# Patient Record
Sex: FEMALE | Race: WHITE | Employment: UNEMPLOYED | URBAN - METROPOLITAN AREA
[De-identification: names, ages, dates, MRNs, and addresses within clinical notes are randomized per-mention and may not be internally consistent; named-entity substitution may affect disease eponyms.]

---

## 2017-02-16 ENCOUNTER — HOSPITAL ENCOUNTER (EMERGENCY)
Age: 47
Discharge: HOME | End: 2017-02-17
Attending: EMERGENCY MEDICINE
Payer: SUBSIDIZED

## 2017-02-16 ENCOUNTER — APPOINTMENT (OUTPATIENT)
Dept: CT IMAGING | Age: 47
End: 2017-02-16
Attending: EMERGENCY MEDICINE
Payer: SUBSIDIZED

## 2017-02-16 DIAGNOSIS — R10.10 PAIN OF UPPER ABDOMEN: Primary | ICD-10-CM

## 2017-02-16 LAB
ALBUMIN SERPL BCP-MCNC: 4.5 G/DL (ref 3.5–5)
ALBUMIN/GLOB SERPL: 1.2 {RATIO} (ref 1.2–3.5)
ALP SERPL-CCNC: 70 U/L (ref 50–136)
ALT SERPL-CCNC: 115 U/L (ref 12–65)
ANION GAP BLD CALC-SCNC: 16 MMOL/L (ref 7–16)
AST SERPL W P-5'-P-CCNC: 120 U/L (ref 15–37)
BILIRUB SERPL-MCNC: 0.3 MG/DL (ref 0.2–1.1)
BUN SERPL-MCNC: 14 MG/DL (ref 6–23)
CALCIUM SERPL-MCNC: 9.2 MG/DL (ref 8.3–10.4)
CHLORIDE SERPL-SCNC: 101 MMOL/L (ref 98–107)
CO2 SERPL-SCNC: 24 MMOL/L (ref 21–32)
CREAT SERPL-MCNC: 0.91 MG/DL (ref 0.6–1)
ERYTHROCYTE [DISTWIDTH] IN BLOOD BY AUTOMATED COUNT: 12.5 % (ref 11.9–14.6)
GLOBULIN SER CALC-MCNC: 3.8 G/DL (ref 2.3–3.5)
GLUCOSE SERPL-MCNC: 112 MG/DL (ref 65–100)
HCT VFR BLD AUTO: 44.7 % (ref 35.8–46.3)
HGB BLD-MCNC: 15 G/DL (ref 11.7–15.4)
LIPASE SERPL-CCNC: 164 U/L (ref 73–393)
MCH RBC QN AUTO: 29.9 PG (ref 26.1–32.9)
MCHC RBC AUTO-ENTMCNC: 33.6 G/DL (ref 31.4–35)
MCV RBC AUTO: 89 FL (ref 79.6–97.8)
PLATELET # BLD AUTO: 352 K/UL (ref 150–450)
PMV BLD AUTO: 9.9 FL (ref 10.8–14.1)
POTASSIUM SERPL-SCNC: 4 MMOL/L (ref 3.5–5.1)
PROT SERPL-MCNC: 8.3 G/DL (ref 6.3–8.2)
RBC # BLD AUTO: 5.02 M/UL (ref 4.05–5.25)
SODIUM SERPL-SCNC: 141 MMOL/L (ref 136–145)
WBC # BLD AUTO: 11.5 K/UL (ref 4.3–11.1)

## 2017-02-16 PROCEDURE — 96361 HYDRATE IV INFUSION ADD-ON: CPT | Performed by: EMERGENCY MEDICINE

## 2017-02-16 PROCEDURE — 74011250637 HC RX REV CODE- 250/637: Performed by: EMERGENCY MEDICINE

## 2017-02-16 PROCEDURE — 80053 COMPREHEN METABOLIC PANEL: CPT | Performed by: EMERGENCY MEDICINE

## 2017-02-16 PROCEDURE — 74011250636 HC RX REV CODE- 250/636: Performed by: EMERGENCY MEDICINE

## 2017-02-16 PROCEDURE — 96374 THER/PROPH/DIAG INJ IV PUSH: CPT | Performed by: EMERGENCY MEDICINE

## 2017-02-16 PROCEDURE — 74011000250 HC RX REV CODE- 250: Performed by: EMERGENCY MEDICINE

## 2017-02-16 PROCEDURE — 96375 TX/PRO/DX INJ NEW DRUG ADDON: CPT | Performed by: EMERGENCY MEDICINE

## 2017-02-16 PROCEDURE — 83690 ASSAY OF LIPASE: CPT | Performed by: EMERGENCY MEDICINE

## 2017-02-16 PROCEDURE — 85027 COMPLETE CBC AUTOMATED: CPT | Performed by: EMERGENCY MEDICINE

## 2017-02-16 PROCEDURE — 99283 EMERGENCY DEPT VISIT LOW MDM: CPT | Performed by: EMERGENCY MEDICINE

## 2017-02-16 PROCEDURE — 74011636320 HC RX REV CODE- 636/320: Performed by: EMERGENCY MEDICINE

## 2017-02-16 PROCEDURE — 81003 URINALYSIS AUTO W/O SCOPE: CPT | Performed by: EMERGENCY MEDICINE

## 2017-02-16 RX ORDER — SODIUM CHLORIDE 0.9 % (FLUSH) 0.9 %
10 SYRINGE (ML) INJECTION
Status: COMPLETED | OUTPATIENT
Start: 2017-02-16 | End: 2017-02-17

## 2017-02-16 RX ORDER — PANTOPRAZOLE SODIUM 40 MG/1
40 GRANULE, DELAYED RELEASE ORAL DAILY
COMMUNITY

## 2017-02-16 RX ORDER — FAMOTIDINE 10 MG/ML
20 INJECTION INTRAVENOUS
Status: COMPLETED | OUTPATIENT
Start: 2017-02-16 | End: 2017-02-16

## 2017-02-16 RX ORDER — HYOSCYAMINE SULFATE 0.12 MG/1
0.12 TABLET SUBLINGUAL
Status: COMPLETED | OUTPATIENT
Start: 2017-02-16 | End: 2017-02-16

## 2017-02-16 RX ORDER — GEMFIBROZIL 600 MG/1
600 TABLET, FILM COATED ORAL 2 TIMES DAILY
COMMUNITY

## 2017-02-16 RX ORDER — HYDROMORPHONE HYDROCHLORIDE 1 MG/ML
0.5 INJECTION, SOLUTION INTRAMUSCULAR; INTRAVENOUS; SUBCUTANEOUS
Status: COMPLETED | OUTPATIENT
Start: 2017-02-16 | End: 2017-02-16

## 2017-02-16 RX ADMIN — HYDROMORPHONE HYDROCHLORIDE 0.5 MG: 1 INJECTION, SOLUTION INTRAMUSCULAR; INTRAVENOUS; SUBCUTANEOUS at 21:55

## 2017-02-16 RX ADMIN — HYOSCYAMINE SULFATE 0.12 MG: 0.12 TABLET SUBLINGUAL at 21:55

## 2017-02-16 RX ADMIN — SODIUM CHLORIDE 1000 ML: 900 INJECTION, SOLUTION INTRAVENOUS at 21:55

## 2017-02-16 RX ADMIN — FAMOTIDINE 20 MG: 10 INJECTION, SOLUTION INTRAVENOUS at 21:55

## 2017-02-16 RX ADMIN — DIATRIZOATE MEGLUMINE AND DIATRIZOATE SODIUM 15 ML: 600; 100 SOLUTION ORAL; RECTAL at 21:55

## 2017-02-17 ENCOUNTER — APPOINTMENT (OUTPATIENT)
Dept: CT IMAGING | Age: 47
End: 2017-02-17
Attending: EMERGENCY MEDICINE
Payer: SUBSIDIZED

## 2017-02-17 VITALS
DIASTOLIC BLOOD PRESSURE: 59 MMHG | BODY MASS INDEX: 40.62 KG/M2 | HEART RATE: 73 BPM | RESPIRATION RATE: 18 BRPM | TEMPERATURE: 97.8 F | WEIGHT: 268 LBS | HEIGHT: 68 IN | SYSTOLIC BLOOD PRESSURE: 128 MMHG | OXYGEN SATURATION: 100 %

## 2017-02-17 LAB — HCG UR QL: NEGATIVE

## 2017-02-17 PROCEDURE — 74011250636 HC RX REV CODE- 250/636

## 2017-02-17 PROCEDURE — 74011000258 HC RX REV CODE- 258: Performed by: EMERGENCY MEDICINE

## 2017-02-17 PROCEDURE — 74177 CT ABD & PELVIS W/CONTRAST: CPT

## 2017-02-17 PROCEDURE — 81025 URINE PREGNANCY TEST: CPT

## 2017-02-17 PROCEDURE — 74011250637 HC RX REV CODE- 250/637: Performed by: EMERGENCY MEDICINE

## 2017-02-17 PROCEDURE — 74011636320 HC RX REV CODE- 636/320: Performed by: EMERGENCY MEDICINE

## 2017-02-17 RX ORDER — HYDROCODONE BITARTRATE AND ACETAMINOPHEN 7.5; 325 MG/1; MG/1
1 TABLET ORAL
Qty: 8 TAB | Refills: 0 | Status: SHIPPED | OUTPATIENT
Start: 2017-02-17 | End: 2017-11-14

## 2017-02-17 RX ORDER — HYOSCYAMINE SULFATE 0.12 MG/1
TABLET SUBLINGUAL
Status: DISCONTINUED
Start: 2017-02-17 | End: 2017-02-17 | Stop reason: HOSPADM

## 2017-02-17 RX ORDER — HYOSCYAMINE SULFATE 0.125 MG
125 TABLET ORAL
Qty: 20 TAB | Refills: 0 | Status: SHIPPED | OUTPATIENT
Start: 2017-02-17 | End: 2017-11-14

## 2017-02-17 RX ORDER — KETOROLAC TROMETHAMINE 30 MG/ML
INJECTION, SOLUTION INTRAMUSCULAR; INTRAVENOUS
Status: COMPLETED
Start: 2017-02-17 | End: 2017-02-17

## 2017-02-17 RX ORDER — HYOSCYAMINE SULFATE 0.12 MG/1
0.12 TABLET SUBLINGUAL
Status: COMPLETED | OUTPATIENT
Start: 2017-02-17 | End: 2017-02-17

## 2017-02-17 RX ORDER — KETOROLAC TROMETHAMINE 30 MG/ML
30 INJECTION, SOLUTION INTRAMUSCULAR; INTRAVENOUS
Status: COMPLETED | OUTPATIENT
Start: 2017-02-17 | End: 2017-02-17

## 2017-02-17 RX ADMIN — KETOROLAC TROMETHAMINE 30 MG: 30 INJECTION, SOLUTION INTRAMUSCULAR; INTRAVENOUS at 01:22

## 2017-02-17 RX ADMIN — HYOSCYAMINE SULFATE 0.12 MG: 0.12 TABLET SUBLINGUAL at 01:13

## 2017-02-17 RX ADMIN — Medication 10 ML: at 00:27

## 2017-02-17 RX ADMIN — SODIUM CHLORIDE 100 ML: 900 INJECTION, SOLUTION INTRAVENOUS at 00:27

## 2017-02-17 RX ADMIN — IOPAMIDOL 100 ML: 755 INJECTION, SOLUTION INTRAVENOUS at 00:27

## 2017-02-17 NOTE — DISCHARGE INSTRUCTIONS

## 2017-02-17 NOTE — ED TRIAGE NOTES
Pt states she is having abd pain more to the left than middle. Pt states that the pain comes and goes with sharp hard cramping.  Denies n and v also states that she is having diarrhea
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
## 2017-04-10 ENCOUNTER — HOSPITAL ENCOUNTER (EMERGENCY)
Age: 47
Discharge: HOME | End: 2017-04-10
Attending: EMERGENCY MEDICINE
Payer: SELF-PAY

## 2017-04-10 VITALS
DIASTOLIC BLOOD PRESSURE: 81 MMHG | RESPIRATION RATE: 16 BRPM | SYSTOLIC BLOOD PRESSURE: 150 MMHG | HEART RATE: 83 BPM | BODY MASS INDEX: 40.9 KG/M2 | WEIGHT: 269 LBS | OXYGEN SATURATION: 98 % | TEMPERATURE: 98.1 F

## 2017-04-10 DIAGNOSIS — N94.6 DYSMENORRHEA: Primary | ICD-10-CM

## 2017-04-10 LAB
ALBUMIN SERPL BCP-MCNC: 4.3 G/DL (ref 3.5–5)
ALBUMIN/GLOB SERPL: 1.1 {RATIO} (ref 1.2–3.5)
ALP SERPL-CCNC: 68 U/L (ref 50–136)
ALT SERPL-CCNC: 102 U/L (ref 12–65)
ANION GAP BLD CALC-SCNC: 11 MMOL/L (ref 7–16)
AST SERPL W P-5'-P-CCNC: 75 U/L (ref 15–37)
BASOPHILS # BLD AUTO: 0 K/UL (ref 0–0.2)
BASOPHILS # BLD: 0 % (ref 0–2)
BILIRUB SERPL-MCNC: 0.2 MG/DL (ref 0.2–1.1)
BUN SERPL-MCNC: 14 MG/DL (ref 6–23)
CALCIUM SERPL-MCNC: 9.3 MG/DL (ref 8.3–10.4)
CHLORIDE SERPL-SCNC: 104 MMOL/L (ref 98–107)
CO2 SERPL-SCNC: 27 MMOL/L (ref 21–32)
CREAT SERPL-MCNC: 0.75 MG/DL (ref 0.6–1)
DIFFERENTIAL METHOD BLD: ABNORMAL
EOSINOPHIL # BLD: 0.2 K/UL (ref 0–0.8)
EOSINOPHIL NFR BLD: 2 % (ref 0.5–7.8)
ERYTHROCYTE [DISTWIDTH] IN BLOOD BY AUTOMATED COUNT: 13.1 % (ref 11.9–14.6)
GLOBULIN SER CALC-MCNC: 4 G/DL (ref 2.3–3.5)
GLUCOSE SERPL-MCNC: 115 MG/DL (ref 65–100)
HCG UR QL: NEGATIVE
HCT VFR BLD AUTO: 43.9 % (ref 35.8–46.3)
HGB BLD-MCNC: 14.5 G/DL (ref 11.7–15.4)
IMM GRANULOCYTES # BLD: 0 K/UL (ref 0–0.5)
IMM GRANULOCYTES NFR BLD AUTO: 0.3 % (ref 0–5)
LYMPHOCYTES # BLD AUTO: 39 % (ref 13–44)
LYMPHOCYTES # BLD: 3.7 K/UL (ref 0.5–4.6)
MCH RBC QN AUTO: 29.7 PG (ref 26.1–32.9)
MCHC RBC AUTO-ENTMCNC: 33 G/DL (ref 31.4–35)
MCV RBC AUTO: 89.8 FL (ref 79.6–97.8)
MONOCYTES # BLD: 0.6 K/UL (ref 0.1–1.3)
MONOCYTES NFR BLD AUTO: 7 % (ref 4–12)
NEUTS SEG # BLD: 5 K/UL (ref 1.7–8.2)
NEUTS SEG NFR BLD AUTO: 52 % (ref 43–78)
PLATELET # BLD AUTO: 304 K/UL (ref 150–450)
PMV BLD AUTO: 10.3 FL (ref 10.8–14.1)
POTASSIUM SERPL-SCNC: 4.1 MMOL/L (ref 3.5–5.1)
PROT SERPL-MCNC: 8.3 G/DL (ref 6.3–8.2)
RBC # BLD AUTO: 4.89 M/UL (ref 4.05–5.25)
SODIUM SERPL-SCNC: 142 MMOL/L (ref 136–145)
TSH SERPL DL<=0.005 MIU/L-ACNC: 0.36 UIU/ML (ref 0.36–3.74)
WBC # BLD AUTO: 9.6 K/UL (ref 4.3–11.1)

## 2017-04-10 PROCEDURE — 85025 COMPLETE CBC W/AUTO DIFF WBC: CPT | Performed by: EMERGENCY MEDICINE

## 2017-04-10 PROCEDURE — 84439 ASSAY OF FREE THYROXINE: CPT | Performed by: EMERGENCY MEDICINE

## 2017-04-10 PROCEDURE — 84443 ASSAY THYROID STIM HORMONE: CPT | Performed by: EMERGENCY MEDICINE

## 2017-04-10 PROCEDURE — 80053 COMPREHEN METABOLIC PANEL: CPT | Performed by: EMERGENCY MEDICINE

## 2017-04-10 PROCEDURE — 81003 URINALYSIS AUTO W/O SCOPE: CPT | Performed by: EMERGENCY MEDICINE

## 2017-04-10 PROCEDURE — 81025 URINE PREGNANCY TEST: CPT

## 2017-04-10 PROCEDURE — 99282 EMERGENCY DEPT VISIT SF MDM: CPT | Performed by: EMERGENCY MEDICINE

## 2017-04-10 RX ORDER — SODIUM CHLORIDE 0.9 % (FLUSH) 0.9 %
5-10 SYRINGE (ML) INJECTION AS NEEDED
Status: DISCONTINUED | OUTPATIENT
Start: 2017-04-10 | End: 2017-04-10 | Stop reason: HOSPADM

## 2017-04-10 RX ORDER — SODIUM CHLORIDE 0.9 % (FLUSH) 0.9 %
5-10 SYRINGE (ML) INJECTION EVERY 8 HOURS
Status: DISCONTINUED | OUTPATIENT
Start: 2017-04-10 | End: 2017-04-10 | Stop reason: HOSPADM

## 2017-04-10 NOTE — ED PROVIDER NOTES
HPI Comments: Patient presents from for evaluation of unusual vaginal bleeding. She states that her menses are somewhat irregular and is been 3 weeks since her last menstrual period. She states that they seem to be getting closer together and that is not unusual for them to last 78 days. However she states that she didn't be bleeding quite heavily for the past 4 days passing large clots frequently and changing pads every 30-40 minutes. She denies any fever or chills she denies any lightheadedness or syncope she denies any history of fibroid tumors or anemia. She does have a history of ovarian cyst and is currently taking Synthroid for hypothyroidism. She complains of some menstrual cramps however these are not any more severe than previous    Patient is a 55 y.o. female presenting with vaginal bleeding. The history is provided by the patient. Vaginal Bleeding   This is a new problem. The current episode started more than 2 days ago. The problem occurs constantly. The problem has not changed since onset. Pertinent negatives include no chest pain, no abdominal pain, no headaches and no shortness of breath. Nothing aggravates the symptoms. Nothing relieves the symptoms. She has tried nothing for the symptoms. The treatment provided no relief. Past Medical History:   Diagnosis Date    Other ill-defined conditions     diverticulitis    Seizures ([address]        Past Surgical History:   Procedure Laterality Date    HX GYN           History reviewed. No pertinent family history. Social History     Social History    Marital status: SINGLE     Spouse name: N/A    Number of children: N/A    Years of education: N/A     Occupational History    Not on file.      Social History Main Topics    Smoking status: Current Every Day Smoker     Packs/day: 1.00    Smokeless tobacco: Not on file    Alcohol use No    Drug use: Not on file    Sexual activity: Not on file     Other Topics Concern    Not on file Social History Narrative         ALLERGIES: Bactrim [sulfamethoxazole-trimethoprim]    Review of Systems   Respiratory: Negative for shortness of breath. Cardiovascular: Negative for chest pain. Gastrointestinal: Negative for abdominal pain. Genitourinary: Positive for vaginal bleeding. Neurological: Negative for headaches. All other systems reviewed and are negative. Vitals:    04/10/17 1645   BP: 150/81   Pulse: 83   Resp: 16   Temp: 98.1 °F (36.7 °C)   SpO2: 98%   Weight: 122 kg (269 lb)            Physical Exam   Constitutional: She is oriented to person, place, and time. She appears well-developed and well-nourished. No distress. HENT:   Head: Normocephalic and atraumatic. Mouth/Throat: No oropharyngeal exudate. Eyes: Conjunctivae and EOM are normal. Pupils are equal, round, and reactive to light. Neck: Normal range of motion. Neck supple. Cardiovascular: Normal rate. Pulmonary/Chest: Effort normal.   Abdominal: Soft. Bowel sounds are normal. She exhibits no distension. There is no tenderness. Musculoskeletal: Normal range of motion. Neurological: She is alert and oriented to person, place, and time. Skin: Skin is warm and dry. Psychiatric: She has a normal mood and affect. Her behavior is normal.   Nursing note and vitals reviewed. MDM  Number of Diagnoses or Management Options  Dysmenorrhea:      Amount and/or Complexity of Data Reviewed  Clinical lab tests: ordered and reviewed    Risk of Complications, Morbidity, and/or Mortality  Presenting problems: moderate  Diagnostic procedures: moderate  Management options: low  General comments: Patient demonstrates no signs of orthostasis and is stable and normal hemoglobin and hematocrit. Most likely dysmenorrhea patient did have a pelvic exam and Pap smear 8 months ago. She is seen at the [address] and will make an appointment to see the obstetrician when available.     Patient Progress  Patient progress: stable    ED Course       Procedures

## 2017-04-10 NOTE — DISCHARGE INSTRUCTIONS
Painful Menstrual Cramps: Care Instructions  Your Care Instructions    Painful menstrual cramps are very common. Many women go to the doctor because of bad cramps when they get their period. You may have cramps in your back, thighs, and belly. You may also have diarrhea, constipation, or nausea. Some women also get dizzy. Pain medicine and home treatment can help you feel better. Follow-up care is a key part of your treatment and safety. Be sure to make and go to all appointments, and call your doctor if you are having problems. It's also a good idea to know your test results and keep a list of the medicines you take. How can you care for yourself at home? · Take anti-inflammatory medicines for pain. Ibuprofen (Advil, Motrin) and naproxen (Aleve) usually work better than aspirin. ¨ Be safe with medicines. Talk to your doctor or pharmacist before you take any of these medicines. They may not be safe if you take other medicines or have other health problems. ¨ Start taking the recommended dose of pain medicine as soon as you start to feel pain. Or you can start on the day before your period. Keep taking the medicine for as many days as you have cramps. ¨ If anti-inflammatory medicines don't help, try acetaminophen (Tylenol). ¨ Do not take two or more pain medicines at the same time unless the doctor told you to. Many pain medicines have acetaminophen, which is Tylenol. Too much acetaminophen (Tylenol) can be harmful. ¨ Read and follow all instructions on the label. · Put a heating pad set on low or a hot water bottle on your belly. Or take a warm bath. Heat improves blood flow and may help with pain. · Lie down and put a pillow under your knees. Or lie on your side and bring your knees up to your chest. This will help with any back pressure. · Get at least 30 minutes of exercise on most days of the week. This improves blood flow and may decrease pain. Walking is a good choice.  You also may want to do other activities, such as running, swimming, cycling, or playing tennis or team sports. When should you call for help? Call 911 anytime you think you may need emergency care. For example, call if:  · You passed out (lost consciousness). Call your doctor now or seek immediate medical care if:  · You have sudden, severe pain in your belly or pelvis. · You have severe vaginal bleeding. This means that you are soaking through your usual pads or tampons every hour for 2 or more hours and passing clots of blood. · You are dizzy or lightheaded, or you feel like you may faint. Watch closely for changes in your health, and be sure to contact your doctor if:  · You think you may be pregnant. · You have new belly or pelvic pain. · You are taking pain medicine, but it is not helping. · You feel weak and tired. Where can you learn more? Go to http://maria elena-gus.info/. Enter [id number] in the search box to learn more about \"Painful Menstrual Cramps: Care Instructions. \"  Current as of: October 13, 2016  Content Version: 11.2  © 3850-4485 [employer]. Care instructions adapted under license by [employer] (which disclaims liability or warranty for this information). If you have questions about a medical condition or this instruction, always ask your healthcare professional. [address] any warranty or liability for your use of this information.

## 2017-04-10 NOTE — ED NOTES
Patient not present in room for discharge. 18g IV left in bed. Patient not available for vital signs.

## 2017-04-11 LAB — T4 FREE SERPL-MCNC: 0.8 NG/DL (ref 0.78–1.46)

## 2017-11-14 ENCOUNTER — APPOINTMENT (OUTPATIENT)
Dept: GENERAL RADIOLOGY | Age: 47
End: 2017-11-14
Attending: EMERGENCY MEDICINE
Payer: SELF-PAY

## 2017-11-14 ENCOUNTER — HOSPITAL ENCOUNTER (EMERGENCY)
Age: 47
Discharge: HOME | End: 2017-11-14
Attending: EMERGENCY MEDICINE
Payer: SELF-PAY

## 2017-11-14 VITALS
SYSTOLIC BLOOD PRESSURE: 150 MMHG | TEMPERATURE: 98.4 F | BODY MASS INDEX: 37.44 KG/M2 | HEART RATE: 52 BPM | DIASTOLIC BLOOD PRESSURE: 68 MMHG | OXYGEN SATURATION: 98 % | WEIGHT: 247 LBS | HEIGHT: 68 IN | RESPIRATION RATE: 16 BRPM

## 2017-11-14 DIAGNOSIS — J11.1 INFLUENZA-LIKE ILLNESS: Primary | ICD-10-CM

## 2017-11-14 LAB
FLUAV AG NPH QL IA: NEGATIVE
FLUBV AG NPH QL IA: NEGATIVE

## 2017-11-14 PROCEDURE — 87804 INFLUENZA ASSAY W/OPTIC: CPT | Performed by: EMERGENCY MEDICINE

## 2017-11-14 PROCEDURE — 71020 XR CHEST PA LAT: CPT

## 2017-11-14 PROCEDURE — 99282 EMERGENCY DEPT VISIT SF MDM: CPT | Performed by: EMERGENCY MEDICINE

## 2017-11-14 RX ORDER — BROMPHENIRAMINE MALEATE, PSEUDOEPHEDRINE HYDROCHLORIDE, AND DEXTROMETHORPHAN HYDROBROMIDE 2; 30; 10 MG/5ML; MG/5ML; MG/5ML
5-10 SYRUP ORAL
Qty: 250 ML | Refills: 0 | Status: SHIPPED | OUTPATIENT
Start: 2017-11-14

## 2017-11-14 NOTE — ED PROVIDER NOTES
Patient is a 55 y.o. female presenting with flu symptoms. The history is provided by the patient. Flu   This is a new problem. The current episode started 2 days ago. The problem occurs constantly. The problem has not changed since onset. The cough is non-productive. There has been no fever. Associated symptoms include chills, rhinorrhea, myalgias and nausea. Pertinent negatives include no chest pain, no sweats, no weight loss, no eye redness, no ear congestion, no ear pain, no headaches, no sore throat, no shortness of breath, no wheezing, no vomiting and no confusion. Treatments tried: OTC meds. The treatment provided no relief. She is not a smoker. Her past medical history is significant for bronchitis. Her past medical history does not include pneumonia or asthma. Past Medical History:   Diagnosis Date    Other ill-defined conditions     diverticulitis    Seizures ([facility] [place] [address]        Past Surgical History:   Procedure Laterality Date    HX GYN           No family history on file. Social History     Social History    Marital status: SINGLE     Spouse name: N/A    Number of children: N/A    Years of education: N/A     Occupational History    Not on file. Social History Main Topics    Smoking status: Current Every Day Smoker     Packs/day: 1.00    Smokeless tobacco: Not on file    Alcohol use No    Drug use: Not on file    Sexual activity: Not on file     Other Topics Concern    Not on file     Social History Narrative         ALLERGIES: Bactrim [sulfamethoxazole-trimethoprim]    Review of Systems   Constitutional: Positive for chills. Negative for weight loss. HENT: Positive for rhinorrhea. Negative for ear pain and sore throat. Eyes: Negative for redness. Respiratory: Negative for shortness of breath and wheezing. Cardiovascular: Negative for chest pain. Gastrointestinal: Positive for nausea. Negative for vomiting. Musculoskeletal: Positive for myalgias.    Neurological: Negative for headaches. Psychiatric/Behavioral: Negative for confusion. All other systems reviewed and are negative. Vitals:    11/14/17 0755   BP: 150/68   Pulse: (!) 52   Resp: 16   Temp: 98.4 °F (36.9 °C)   SpO2: 98%   Weight: 112 kg (247 lb)   Height: 5' 8\" (1.727 m)            Physical Exam   Constitutional: She is oriented to person, place, and time. She appears well-developed and well-nourished. No distress. HENT:   Head: Normocephalic and atraumatic. Right Ear: External ear normal.   Left Ear: External ear normal.   Mouth/Throat: Oropharynx is clear and moist.   Eyes: Conjunctivae and EOM are normal. Pupils are equal, round, and reactive to light. Neck: Normal range of motion. Neck supple. Cardiovascular: Normal rate and regular rhythm. Pulmonary/Chest: Effort normal and breath sounds normal.   Neurological: She is alert and oriented to person, place, and time. She has normal reflexes. Skin: Skin is warm and dry. Psychiatric: She has a normal mood and affect. Her behavior is normal.   Nursing note and vitals reviewed.        MDM  Number of Diagnoses or Management Options     Amount and/or Complexity of Data Reviewed  Clinical lab tests: ordered and reviewed  Tests in the radiology section of CPT®: ordered and reviewed    Risk of Complications, Morbidity, and/or Mortality  Presenting problems: low  Diagnostic procedures: low  Management options: low    Patient Progress  Patient progress: stable    ED Course       Procedures

## 2017-11-14 NOTE — ED NOTES
I have reviewed discharge instructions with the patient. The patient verbalized understanding. Patient left ED via Discharge Method: ambulatory to Home alone in no acute distress. Opportunity for questions and clarification provided. Patient given 1 scripts. To continue your aftercare when you leave the hospital, you may receive an automated call from our care team to check in on how you are doing. This is a free service and part of our promise to provide the best care and service to meet your aftercare needs.  If you have questions, or wish to unsubscribe from this service please call [phone]. Thank you for Choosing our [facility] Emergency Department.

## 2022-06-15 ENCOUNTER — HOSPITAL ENCOUNTER (EMERGENCY)
Age: 52
Discharge: HOME | End: 2022-06-15
Attending: EMERGENCY MEDICINE

## 2022-06-15 VITALS
DIASTOLIC BLOOD PRESSURE: 82 MMHG | WEIGHT: 210 LBS | BODY MASS INDEX: 32.96 KG/M2 | SYSTOLIC BLOOD PRESSURE: 122 MMHG | HEART RATE: 86 BPM | RESPIRATION RATE: 16 BRPM | OXYGEN SATURATION: 99 % | HEIGHT: 67 IN | TEMPERATURE: 98.9 F

## 2022-06-15 DIAGNOSIS — N39.0 ACUTE UTI: Primary | ICD-10-CM

## 2022-06-15 LAB
ALBUMIN SERPL-MCNC: 3.4 G/DL (ref 3.5–5)
ALBUMIN/GLOB SERPL: 0.9 {RATIO} (ref 1.2–3.5)
ALP SERPL-CCNC: 61 U/L (ref 50–130)
ALT SERPL-CCNC: 19 U/L (ref 12–65)
ANION GAP SERPL CALC-SCNC: 9 MMOL/L (ref 7–16)
AST SERPL-CCNC: 13 U/L (ref 15–37)
BACTERIA URNS QL MICRO: ABNORMAL /HPF
BASOPHILS # BLD: 0 K/UL (ref 0–0.2)
BASOPHILS NFR BLD: 0 % (ref 0–2)
BILIRUB SERPL-MCNC: 0.3 MG/DL (ref 0.2–1.1)
BUN SERPL-MCNC: 13 MG/DL (ref 6–23)
CALCIUM SERPL-MCNC: 9.7 MG/DL (ref 8.3–10.4)
CASTS URNS QL MICRO: 0 /LPF
CHLORIDE SERPL-SCNC: 108 MMOL/L (ref 98–107)
CO2 SERPL-SCNC: 25 MMOL/L (ref 21–32)
CREAT SERPL-MCNC: 0.56 MG/DL (ref 0.6–1)
CRYSTALS URNS QL MICRO: ABNORMAL /LPF
D DIMER PPP FEU-MCNC: 0.34 UG/ML(FEU)
DIFFERENTIAL METHOD BLD: ABNORMAL
EOSINOPHIL # BLD: 0.2 K/UL (ref 0–0.8)
EOSINOPHIL NFR BLD: 1 % (ref 0.5–7.8)
EPI CELLS #/AREA URNS HPF: ABNORMAL /HPF
ERYTHROCYTE [DISTWIDTH] IN BLOOD BY AUTOMATED COUNT: 12.9 % (ref 11.9–14.6)
GLOBULIN SER CALC-MCNC: 3.6 G/DL (ref 2.3–3.5)
GLUCOSE SERPL-MCNC: 103 MG/DL (ref 65–100)
HCG UR QL: NEGATIVE
HCT VFR BLD AUTO: 44.5 % (ref 35.8–46.3)
HGB BLD-MCNC: 14.5 G/DL (ref 11.7–15.4)
IMM GRANULOCYTES # BLD AUTO: 0.1 K/UL (ref 0–0.5)
IMM GRANULOCYTES NFR BLD AUTO: 1 % (ref 0–5)
LIPASE SERPL-CCNC: 124 U/L (ref 73–393)
LYMPHOCYTES # BLD: 3 K/UL (ref 0.5–4.6)
LYMPHOCYTES NFR BLD: 24 % (ref 13–44)
MAGNESIUM SERPL-MCNC: 2 MG/DL (ref 1.8–2.4)
MCH RBC QN AUTO: 28.3 PG (ref 26.1–32.9)
MCHC RBC AUTO-ENTMCNC: 32.6 G/DL (ref 31.4–35)
MCV RBC AUTO: 86.9 FL (ref 79.6–97.8)
MONOCYTES # BLD: 0.6 K/UL (ref 0.1–1.3)
MONOCYTES NFR BLD: 4 % (ref 4–12)
NEUTS SEG # BLD: 8.8 K/UL (ref 1.7–8.2)
NEUTS SEG NFR BLD: 70 % (ref 43–78)
NRBC # BLD: 0 K/UL (ref 0–0.2)
PLATELET # BLD AUTO: 265 K/UL (ref 150–450)
PMV BLD AUTO: 9.8 FL (ref 9.4–12.3)
POTASSIUM SERPL-SCNC: 3.8 MMOL/L (ref 3.5–5.1)
PROT SERPL-MCNC: 7 G/DL (ref 6.3–8.2)
RBC # BLD AUTO: 5.12 M/UL (ref 4.05–5.2)
RBC #/AREA URNS HPF: ABNORMAL /HPF
SODIUM SERPL-SCNC: 142 MMOL/L (ref 136–145)
WBC # BLD AUTO: 12.7 K/UL (ref 4.3–11.1)
WBC URNS QL MICRO: ABNORMAL /HPF

## 2022-06-15 PROCEDURE — 81015 MICROSCOPIC EXAM OF URINE: CPT

## 2022-06-15 PROCEDURE — 6360000002 HC RX W HCPCS: Performed by: NURSE PRACTITIONER

## 2022-06-15 PROCEDURE — 99284 EMERGENCY DEPT VISIT MOD MDM: CPT

## 2022-06-15 PROCEDURE — 81025 URINE PREGNANCY TEST: CPT

## 2022-06-15 PROCEDURE — 80053 COMPREHEN METABOLIC PANEL: CPT

## 2022-06-15 PROCEDURE — 85025 COMPLETE CBC W/AUTO DIFF WBC: CPT

## 2022-06-15 PROCEDURE — 96365 THER/PROPH/DIAG IV INF INIT: CPT

## 2022-06-15 PROCEDURE — 85379 FIBRIN DEGRADATION QUANT: CPT

## 2022-06-15 PROCEDURE — 2580000003 HC RX 258: Performed by: NURSE PRACTITIONER

## 2022-06-15 PROCEDURE — 83735 ASSAY OF MAGNESIUM: CPT

## 2022-06-15 PROCEDURE — 83690 ASSAY OF LIPASE: CPT

## 2022-06-15 PROCEDURE — 96375 TX/PRO/DX INJ NEW DRUG ADDON: CPT

## 2022-06-15 RX ORDER — ONDANSETRON 2 MG/ML
4 INJECTION INTRAMUSCULAR; INTRAVENOUS
Status: COMPLETED | OUTPATIENT
Start: 2022-06-15 | End: 2022-06-15

## 2022-06-15 RX ORDER — SODIUM CHLORIDE 0.9 % (FLUSH) 0.9 %
3 SYRINGE (ML) INJECTION EVERY 8 HOURS
Status: DISCONTINUED | OUTPATIENT
Start: 2022-06-15 | End: 2022-06-15 | Stop reason: HOSPADM

## 2022-06-15 RX ORDER — KETOROLAC TROMETHAMINE 30 MG/ML
15 INJECTION, SOLUTION INTRAMUSCULAR; INTRAVENOUS ONCE
Status: COMPLETED | OUTPATIENT
Start: 2022-06-15 | End: 2022-06-15

## 2022-06-15 RX ORDER — CEPHALEXIN 500 MG/1
500 CAPSULE ORAL 3 TIMES DAILY
Qty: 21 CAPSULE | Refills: 0 | Status: SHIPPED | OUTPATIENT
Start: 2022-06-15 | End: 2022-06-22

## 2022-06-15 RX ORDER — 0.9 % SODIUM CHLORIDE 0.9 %
1000 INTRAVENOUS SOLUTION INTRAVENOUS
Status: COMPLETED | OUTPATIENT
Start: 2022-06-15 | End: 2022-06-15

## 2022-06-15 RX ADMIN — SODIUM CHLORIDE 1000 ML: 9 INJECTION, SOLUTION INTRAVENOUS at 12:19

## 2022-06-15 RX ADMIN — CEFTRIAXONE 1000 MG: 1 INJECTION, POWDER, FOR SOLUTION INTRAMUSCULAR; INTRAVENOUS at 14:01

## 2022-06-15 RX ADMIN — KETOROLAC TROMETHAMINE 15 MG: 30 INJECTION, SOLUTION INTRAMUSCULAR at 12:21

## 2022-06-15 RX ADMIN — ONDANSETRON 4 MG: 2 INJECTION INTRAMUSCULAR; INTRAVENOUS at 12:20

## 2022-06-15 ASSESSMENT — PAIN SCALES - GENERAL
PAINLEVEL_OUTOF10: 6

## 2022-06-15 ASSESSMENT — ENCOUNTER SYMPTOMS
NAUSEA: 1
VOMITING: 0
DIARRHEA: 0
SHORTNESS OF BREATH: 1
ABDOMINAL PAIN: 0
COUGH: 1

## 2022-06-15 ASSESSMENT — PAIN - FUNCTIONAL ASSESSMENT: PAIN_FUNCTIONAL_ASSESSMENT: 0-10

## 2022-06-15 NOTE — LETTER
[facility] EMERGENCY DEPT  [address] [address]  Phone: [phone]  Fax: [phone]               Anya 15, 2022    Patient: Tedi Lesches   YOB: 1970   Date of Visit: 6/15/2022       To Whom It May Concern:    Kenyon Gatica was seen and treated in our emergency department on 6/15/2022. She may return to work on 6-16-22.       Sincerely,       Yemi Boston, RN         Signature:__________________________________

## 2022-06-15 NOTE — ED TRIAGE NOTES
Pt states she was dx with covid about two weeks ago and started feeling feeling better last Tuesday but last night started having body aches, chills, headache, sore throat, nausea,  and cough again. States she has also had some painful urination. Masked for triage.

## 2022-06-15 NOTE — Clinical Note
Griselda Distad was seen and treated in our emergency department on 6/15/2022. She may return to work on 06/16/2022. If you have any questions or concerns, please don't hesitate to call.       Trino Chandler, APRN - CNP

## 2022-06-15 NOTE — ED NOTES
I have reviewed discharge instructions with the patient. The patient verbalized understanding. Patient left ED via Discharge Method: ambulatory to Home with self. Opportunity for questions and clarification provided. Patient given 1 scripts. To continue your aftercare when you leave the hospital, you may receive an automated call from our care team to check in on how you are doing. This is a free service and part of our promise to provide the best care and service to meet your aftercare needs.  If you have questions, or wish to unsubscribe from this service please call [phone]. Thank you for Choosing our [facility] Emergency Department.         Lashay Mercado RN  06/15/22 4772

## 2022-06-15 NOTE — CARE COORDINATION
06/15/22 1525   Service Assessment   Patient Orientation Alert and Oriented;Person;Place;Situation     Asked for any agency names that could give her any type of monetary assistance. Gave patient list of agencies for [facility] and also advised to call [phone]. Verb understanding.

## 2022-06-15 NOTE — ED PROVIDER NOTES
[facility] Emergency Department Provider Note                   PCP:                None Provider               Age: 46 y.o. Sex: female       ICD-10-CM    1. Acute UTI  N39.0        DISPOSITION Discharge - Pending Orders Complete 06/15/2022 01:31:43 PM       New Prescriptions    CEPHALEXIN (KEFLEX) 500 MG CAPSULE    Take 1 capsule by mouth 3 times daily for 7 days       Orders Placed This Encounter   Procedures    CBC with Auto Differential    Comprehensive Metabolic Panel    Magnesium    Lipase    D-Dimer, Quantitative    Urinalysis, Micro    POCT Urine Dipstick    Continuous Pulse Oximetry    POC Urine Preg (Select for females age 6-55)   Robert Conway POC Pregnancy Urine Qual    Saline lock IV        Radha Wagoner, APRN - CNP 1:37 PM      MDM  Number of Diagnoses or Management Options  Acute UTI: new, needed workup  Diagnosis management comments: 63-year-old female who presents emergency department today with complaints of chills, body aches, nausea, and shortness of breath. Patient was diagnosed with COVID about 2 weeks ago. She states that her symptoms had improved but yesterday she began to feel poorly again. She appears uncomfortable on exam but is in no acute distress. She is not toxic or acutely ill-appearing. Physical exam is unremarkable. Lung sounds are clear. SPO2 is 98% on room air. Labs are grossly normal.  No elevation in D-dimer today. Urine with some bacteria noted. Possible UTI as the cause of her symptoms but I feel it is more likely that it is due to her recent COVID infection. Will cover with antibiotics for UTI. Encouraged increased hydration. Patient does not wish to be tested for COVID again today. I have discussed the results of all labs, procedures, radiographs, and/or treatments with the patient and available family members. Treatment plan is agreed upon by the patient and the patient is ready for discharge.   Questions about treatment in the ED and differential diagnosis of presenting condition were answered. Patient was given verbal discharge instructions including, but not limited to, importance of returning to the emergency department for any concern of worsening or continued symptoms. Instructions were given to follow-up with a primary care provider or specialist within 1 to 2 days. Adverse effects of medications, if prescribed, were discussed and the patient was advised to refrain from significant physical activity until followed up by a primary care physician and to not drive or operate heavy machinery after taking any sedating substances. Risk of Complications, Morbidity, and/or Mortality  Presenting problems: moderate  Diagnostic procedures: moderate  Management options: low    Patient Progress  Patient progress: improved       Craig Cramer is a 46 y.o. female who presents to the Emergency Department with chief complaint of    Chief Complaint   Patient presents with    Generalized Body Aches      71-year-old female presents emergency department today with complaints of generalized chills, sore throat, body aches, cough, nausea, and shortness of breath. She states symptoms began yesterday. She tested positive for COVID 2 weeks ago but says that she had very mild symptoms and she was feeling completely recovered until the symptoms began last night. She denies any vomiting, diarrhea, chest pain, or abdominal pain. She reports some pain in her lungs with the cough. The cough is nonproductive. She denies any treatment for her symptoms prior to arrival.  She denies any aggravating or relieving factors. The history is provided by the patient. All other systems reviewed and are negative. Review of Systems   Constitutional: Positive for chills and fatigue. Negative for fever. Respiratory: Positive for cough and shortness of breath. Cardiovascular: Negative for chest pain. Gastrointestinal: Positive for nausea.  Negative for abdominal pain, diarrhea and vomiting. Musculoskeletal: Positive for myalgias. All other systems reviewed and are negative. No past medical history on file. No past surgical history on file. No family history on file. Social Connections:     Frequency of Communication with Friends and Family: Not on file    Frequency of Social Gatherings with Friends and Family: Not on file    Attends Zoroastrianism Services: Not on file    Active Member of Clubs or Organizations: Not on file    Attends Club or Organization Meetings: Not on file    Marital Status: Not on file        Allergies   Allergen Reactions    Acyclovir Anaphylaxis    Sulfamethoxazole-Trimethoprim Hives and Itching    Morphine And Related Anxiety    Pantoprazole Rash        Vitals signs and nursing note reviewed. Patient Vitals for the past 4 hrs:   Temp Pulse Resp BP SpO2   06/15/22 1245 -- 82 16 118/81 98 %   06/15/22 1125 99.2 °F (37.3 °C) 81 20 121/75 96 %          Physical Exam  Vitals and nursing note reviewed. Constitutional:       General: She is not in acute distress. Appearance: Normal appearance. She is well-developed. She is not ill-appearing, toxic-appearing or diaphoretic. HENT:      Head: Normocephalic and atraumatic. Right Ear: External ear normal.      Left Ear: External ear normal.      Nose: Rhinorrhea present. No congestion. Mouth/Throat:      Mouth: Mucous membranes are moist.   Eyes:      General: No scleral icterus. Extraocular Movements: Extraocular movements intact. Cardiovascular:      Rate and Rhythm: Normal rate. Heart sounds: Normal heart sounds. Pulmonary:      Effort: Pulmonary effort is normal. No respiratory distress. Breath sounds: Normal breath sounds. Abdominal:      General: Abdomen is flat. Bowel sounds are normal. There is no distension. Palpations: Abdomen is soft. Tenderness: There is no abdominal tenderness. Skin:     General: Skin is warm and dry. Capillary Refill: Capillary refill takes less than 2 seconds. Neurological:      General: No focal deficit present. Mental Status: She is alert and oriented to person, place, and time. Psychiatric:         Mood and Affect: Mood normal.         Behavior: Behavior normal.          Procedures      Labs Reviewed   CBC WITH AUTO DIFFERENTIAL - Abnormal; Notable for the following components:       Result Value    WBC 12.7 (*)     Segs Absolute 8.8 (*)     All other components within normal limits   COMPREHENSIVE METABOLIC PANEL - Abnormal; Notable for the following components:    Chloride 108 (*)     Glucose 103 (*)     CREATININE 0.56 (*)     AST 13 (*)     Albumin 3.4 (*)     Globulin 3.6 (*)     Albumin/Globulin Ratio 0.9 (*)     All other components within normal limits   URINALYSIS, MICRO - Abnormal; Notable for the following components:    BACTERIA, URINE 1+ (*)     All other components within normal limits   MAGNESIUM   LIPASE   D-DIMER, QUANTITATIVE   POC PREGNANCY UR-QUAL   POC PREGNANCY UR-QUAL        No orders to display                            Voice dictation software was used during the making of this note. This software is not perfect and grammatical and other typographical errors may be present. This note has not been completely proofread for errors.        DYLAN Barbosa - [place]  06/15/22 [id number]

## 2022-07-08 ENCOUNTER — HOSPITAL ENCOUNTER (EMERGENCY)
Age: 52
Discharge: HOME | End: 2022-07-08
Attending: EMERGENCY MEDICINE

## 2022-07-08 VITALS
DIASTOLIC BLOOD PRESSURE: 73 MMHG | WEIGHT: 210 LBS | HEART RATE: 73 BPM | OXYGEN SATURATION: 99 % | BODY MASS INDEX: 32.96 KG/M2 | SYSTOLIC BLOOD PRESSURE: 137 MMHG | HEIGHT: 67 IN | RESPIRATION RATE: 17 BRPM | TEMPERATURE: 98.8 F

## 2022-07-08 DIAGNOSIS — G43.809 OTHER MIGRAINE WITHOUT STATUS MIGRAINOSUS, NOT INTRACTABLE: Primary | ICD-10-CM

## 2022-07-08 PROCEDURE — 96372 THER/PROPH/DIAG INJ SC/IM: CPT

## 2022-07-08 PROCEDURE — 99284 EMERGENCY DEPT VISIT MOD MDM: CPT

## 2022-07-08 PROCEDURE — 6370000000 HC RX 637 (ALT 250 FOR IP): Performed by: PHYSICIAN ASSISTANT

## 2022-07-08 RX ORDER — LIDOCAINE 4 G/G
2 PATCH TOPICAL
Status: DISCONTINUED | OUTPATIENT
Start: 2022-07-08 | End: 2022-07-08 | Stop reason: HOSPADM

## 2022-07-08 RX ORDER — ONDANSETRON 4 MG/1
4 TABLET, ORALLY DISINTEGRATING ORAL EVERY 8 HOURS PRN
Qty: 20 TABLET | Refills: 0 | Status: SHIPPED | OUTPATIENT
Start: 2022-07-08

## 2022-07-08 RX ORDER — SUMATRIPTAN 6 MG/.5ML
6 INJECTION, SOLUTION SUBCUTANEOUS
Status: COMPLETED | OUTPATIENT
Start: 2022-07-08 | End: 2022-07-08

## 2022-07-08 RX ORDER — TRAZODONE HYDROCHLORIDE 100 MG/1
200 TABLET ORAL
COMMUNITY
Start: 2022-06-24 | End: 2022-07-24

## 2022-07-08 RX ORDER — GABAPENTIN 300 MG/1
300 CAPSULE ORAL 3 TIMES DAILY
COMMUNITY
Start: 2022-06-24 | End: 2022-07-24

## 2022-07-08 RX ORDER — ONDANSETRON 4 MG/1
4 TABLET, ORALLY DISINTEGRATING ORAL
Status: COMPLETED | OUTPATIENT
Start: 2022-07-08 | End: 2022-07-08

## 2022-07-08 RX ORDER — SUMATRIPTAN 50 MG/1
50 TABLET, FILM COATED ORAL
Qty: 27 TABLET | Refills: 0 | Status: SHIPPED | OUTPATIENT
Start: 2022-07-08 | End: 2022-07-08

## 2022-07-08 RX ORDER — LITHIUM CARBONATE 300 MG/1
1200 TABLET, FILM COATED, EXTENDED RELEASE ORAL
COMMUNITY
Start: 2022-06-24 | End: 2022-07-24

## 2022-07-08 RX ADMIN — ONDANSETRON 4 MG: 4 TABLET, ORALLY DISINTEGRATING ORAL at 20:37

## 2022-07-08 RX ADMIN — SUMATRIPTAN 6 MG: 6 INJECTION, SOLUTION SUBCUTANEOUS at 20:36

## 2022-07-08 ASSESSMENT — PAIN SCALES - GENERAL: PAINLEVEL_OUTOF10: 8

## 2022-07-08 ASSESSMENT — ENCOUNTER SYMPTOMS
EYES NEGATIVE: 1
RESPIRATORY NEGATIVE: 1
SINUS PRESSURE: 0
PHOTOPHOBIA: 0
EYE PAIN: 0
BACK PAIN: 0
TINGLING: 0
VISUAL CHANGE: 0
SWOLLEN GLANDS: 0
ABDOMINAL PAIN: 0
NAUSEA: 1
BLURRED VISION: 0
SORE THROAT: 0
VOMITING: 0
COUGH: 0
DIARRHEA: 0
ALLERGIC/IMMUNOLOGIC NEGATIVE: 1

## 2022-07-08 ASSESSMENT — PAIN DESCRIPTION - DESCRIPTORS: DESCRIPTORS: PENETRATING

## 2022-07-08 ASSESSMENT — PAIN - FUNCTIONAL ASSESSMENT: PAIN_FUNCTIONAL_ASSESSMENT: 0-10

## 2022-07-08 ASSESSMENT — PAIN DESCRIPTION - PAIN TYPE: TYPE: ACUTE PAIN

## 2022-07-08 ASSESSMENT — PAIN DESCRIPTION - LOCATION: LOCATION: HEAD

## 2022-07-08 NOTE — ED TRIAGE NOTES
Pt c/o headache with light sensitivity that is radiating down her neck and into her teeth that started last night. Pt states she had an at home negative covid test. Pt states she has taken tylenol with no relief.

## 2022-07-08 NOTE — Clinical Note
Rosa M Christopher was seen and treated in our emergency department on 7/8/2022. She may return to work on 07/11/2022. If you have any questions or concerns, please don't hesitate to call.       Jennifer Valera MD

## 2022-07-09 NOTE — ED PROVIDER NOTES
[facility] Emergency Department Provider Note                   PCP:                None Provider               Age: 46 y.o. Sex: female     No diagnosis found. DISPOSITION          MDM  Number of Diagnoses or Management Options  Risk of Complications, Morbidity, and/or Mortality  Presenting problems: moderate  Diagnostic procedures: moderate  Management options: moderate  General comments: Patient with a typical migraine, advised to drink plenty of fluids, rest, return to the ED if worsening in any way. Use medication as directed. Patient Progress  Patient progress: improved       No orders of the defined types were placed in this encounter. Lesa Trivedi is a 46 y.o. female who presents to the Emergency Department with chief complaint of    Chief Complaint   Patient presents with    Headache      Patient states she got very upset last night and angry. She felt a headache coming on and when she woke up this morning she had a severe headache with photophobia and phonophobia. She states she has a history of migraine headaches but has not had one in many years. She has pain in her neck but no nuchal rigidity or fever. She has had no congestion. She has had nausea and vomiting today. She came back [facility]. This feels like her normal migraine. No chest pain, shortness of breath, upper abdominal pain, dizziness, weakness, dyspnea exertion, orthopnea, swelling/tingling or weakness to their arms or legs, trouble with urination or bowel movements or other new symptoms. They did ambulate to the room without difficulty and is well hydrated. The history is provided by the patient.    Headache  Pain location:  Generalized  Quality:  Dull  Radiates to:  Does not radiate  Severity currently:  8/10  Severity at highest:  8/10  Onset quality:  Sudden  Duration:  1 day  Timing:  Constant  Progression:  Unchanged  Context: bright light, emotional stress and loud noise    Context: not activity, not caffeine, not coughing, not defecating, not eating, not exposure to cold air, not intercourse and not straining    Relieved by:  Nothing  Worsened by:  Light  Associated symptoms: nausea and neck pain    Associated symptoms: no abdominal pain, no back pain, no blurred vision, no congestion, no cough, no diarrhea, no dizziness, no drainage, no ear pain, no eye pain, no facial pain, no fatigue, no fever, no focal weakness, no hearing loss, no loss of balance, no myalgias, no near-syncope, no neck stiffness, no numbness, no paresthesias, no photophobia, no seizures, no sinus pressure, no sore throat, no swollen glands, no syncope, no tingling, no URI, no visual change, no vomiting and no weakness          Review of Systems   Constitutional: Negative. Negative for fatigue and fever. HENT: Negative. Negative for congestion, ear pain, hearing loss, postnasal drip, sinus pressure and sore throat. Eyes: Negative. Negative for blurred vision, photophobia and pain. Respiratory: Negative. Negative for cough. Cardiovascular: Negative. Negative for syncope and near-syncope. Gastrointestinal: Positive for nausea. Negative for abdominal pain, diarrhea and vomiting. Endocrine: Negative. Genitourinary: Negative. Musculoskeletal: Positive for neck pain. Negative for back pain, myalgias and neck stiffness. Skin: Negative. Allergic/Immunologic: Negative. Neurological: Positive for headaches. Negative for dizziness, focal weakness, seizures, weakness, numbness, paresthesias and loss of balance. Hematological: Negative. Psychiatric/Behavioral: Negative. All other systems reviewed and are negative.       Past Medical History:   Diagnosis Date    Anxiety and depression     Diverticulitis     Hyperlipidemia         Past Surgical History:   Procedure Laterality Date     SECTION      COLON SURGERY      HERNIA REPAIR      JOINT REPLACEMENT      bilateral hip    TUBAL LIGATION History reviewed. No pertinent family history. Social Connections:     Frequency of Communication with Friends and Family: Not on file    Frequency of Social Gatherings with Friends and Family: Not on file    Attends Mu-ism Services: Not on file    Active Member of Clubs or Organizations: Not on file    Attends Club or Organization Meetings: Not on file    Marital Status: Not on file        Allergies   Allergen Reactions    Acyclovir Anaphylaxis    Sulfamethoxazole-Trimethoprim Hives and Itching    Morphine And Related Anxiety    Pantoprazole Rash        Vitals signs and nursing note reviewed. Patient Vitals for the past 4 hrs:   Temp Pulse Resp BP SpO2   07/08/22 2042 -- -- -- (!) 158/84 96 %   07/08/22 1905 98.8 °F (37.1 °C) 73 17 126/75 96 %          Physical Exam  Vitals and nursing note reviewed. Constitutional:       Appearance: Normal appearance. HENT:      Head: Normocephalic and atraumatic. Right Ear: External ear normal.      Left Ear: External ear normal.      Nose: Nose normal.      Mouth/Throat:      Mouth: Mucous membranes are moist.   Eyes:      Extraocular Movements: Extraocular movements intact. Conjunctiva/sclera: Conjunctivae normal.      Pupils: Pupils are equal, round, and reactive to light. Cardiovascular:      Rate and Rhythm: Normal rate. Pulses: Normal pulses. Pulmonary:      Effort: Pulmonary effort is normal.   Abdominal:      General: Abdomen is flat. Palpations: Abdomen is soft. Musculoskeletal:         General: Normal range of motion. Cervical back: Normal range of motion. Skin:     General: Skin is warm. Capillary Refill: Capillary refill takes less than 2 seconds. Neurological:      General: No focal deficit present. Mental Status: She is alert and oriented to person, place, and time. Mental status is at baseline. GCS: GCS eye subscore is 4. GCS verbal subscore is 5. GCS motor subscore is 6.       Cranial Nerves: Cranial nerves are intact. No cranial nerve deficit. Sensory: Sensation is intact. No sensory deficit. Motor: Motor function is intact. No weakness. Coordination: Coordination is intact. Coordination normal.      Gait: Gait is intact. Gait normal.      Deep Tendon Reflexes: Reflexes normal.      Reflex Scores:       Tricep reflexes are 2+ on the right side and 2+ on the left side. Bicep reflexes are 2+ on the right side and 2+ on the left side. Brachioradialis reflexes are 2+ on the right side and 2+ on the left side. Patellar reflexes are 2+ on the right side and 2+ on the left side. Achilles reflexes are 2+ on the right side and 2+ on the left side. Psychiatric:         Mood and Affect: Mood normal.         Behavior: Behavior normal.         Thought Content: Thought content normal.         Judgment: Judgment normal.          Procedures      Labs Reviewed - No data to display     No orders to display                          Voice dictation software was used during the making of this note. This software is not perfect and grammatical and other typographical errors may be present. This note has not been completely proofread for errors.      KIMI Cruz  07/08/22 [id number]

## 2022-07-09 NOTE — ED NOTES
I have reviewed discharge instructions with the patient. The patient verbalized understanding. Patient left ED via Discharge Method: ambulatory to Home with family/friend. Opportunity for questions and clarification provided. Patient given 2 scripts. To continue your aftercare when you leave the hospital, you may receive an automated call from our care team to check in on how you are doing. This is a free service and part of our promise to provide the best care and service to meet your aftercare needs.  If you have questions, or wish to unsubscribe from this service please call [phone]. Thank you for Choosing our [facility] Emergency Department. Robles Wheeler, [facility]0 [facility]  07/08/22 [id number]

## 2024-07-19 NOTE — ED TRIAGE NOTES
Pt reports vaginal bleeding x 4 days. Pt reports she has been passing large clots x 2 days.      Moises Clarke RN The patient has been re-examined and I agree with the above assessment or I updated with my findings.